# Patient Record
Sex: FEMALE | Race: WHITE
[De-identification: names, ages, dates, MRNs, and addresses within clinical notes are randomized per-mention and may not be internally consistent; named-entity substitution may affect disease eponyms.]

---

## 2018-12-30 ENCOUNTER — HOSPITAL ENCOUNTER (EMERGENCY)
Dept: HOSPITAL 80 - FED | Age: 23
Discharge: HOME | End: 2018-12-30
Payer: COMMERCIAL

## 2018-12-30 VITALS — DIASTOLIC BLOOD PRESSURE: 62 MMHG | SYSTOLIC BLOOD PRESSURE: 102 MMHG

## 2018-12-30 DIAGNOSIS — S30.0XXA: Primary | ICD-10-CM
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
## 2018-12-30 NOTE — EDPHY
H & P


Smoking Status: Never smoked


Time Seen by Provider: 12/30/18 21:14


HPI/ROS: 





CHIEF COMPLAINT:  Coccygeal pain





HISTORY OF PRESENT ILLNESS:  23-year-old female here after she fell wall 

hiking.  She states she was [place] a [place] and she slipped and started 

running down a Hill and then lost control and fell and tumbled onto her 

buttock.  There was no loss consciousness.  She denies any head injury, chest 

pain, abdominal pain, arm or leg pain.  She does admit to drinking multiple 

alcoholic beverages this evening.





ROS As detailed in HPI (Pa Xie)


Physical Exam: 





General:  Alert and oriented.  Nontoxic appearing.  No acute distress


HEENT:  Pupils PERRLA. No oral lesions.  


Head:  Atraumatic without Herring signs, raccoon eyes or hemotympanum


Neck:  No midline tenderness


Chest:  Lung sounds clear no chest wall tenderness


Cardiopulmonary:  Regular rate and rhythm.  No lower extremity edema


Skin:  Pink warm and dry.  No lesions.


Muscle skeletal:  Moving all 4 extremities.  Equal strength in upper 

extremities and lower extremities.  Ambulatory.  Tenderness to the lower lumbar 

spine and sacral area


 (Pa Xie)


Constitutional: 


 Initial Vital Signs











Temperature (C)  36.5 C   12/30/18 21:03


 


Heart Rate  140 H  12/30/18 21:03


 


Respiratory Rate  24 H  12/30/18 21:03


 


Blood Pressure  118/80   12/30/18 21:03


 


O2 Sat (%)  94   12/30/18 21:03








 











O2 Delivery Mode               Room Air














Allergies/Adverse Reactions: 


 





No Known Allergies Allergy (Unverified 12/30/18 21:02)


 








Home Medications: 














 Medication  Instructions  Recorded


 


Bcp  12/30/18


 


Hydrocodone/APAP 5/325 [[place] 1 tab PO Q6 #8 tab 12/30/18





5/325 (*)]  














Medical Decision Making





- Diagnostics


Imaging Results: 


 Imaging Impressions





Sacrum and Coccyx X-Ray  12/30/18 21:15


Impression: Normal.


 


2. AP Pelvis


 


History: Pain, fall hiking


 


Findings: The pelvic ring is normally aligned. The SI joints, hip joints and 

pubic symphysis look normal. No fracture is identified. Overall mineralization 

is normal.


 


Impression: Negative.


 


3. Sacrum and Coccyx, 3 views


 


History: Pain post fall hiking


 


Findings: Fecal material obscures portions of the sacrum on the 2 frontal 

views. On the lateral view the sacrum and coccyx are normally aligned. No 

fracture is identified.


 


Impression: Negative.








Lumbar Spine X-Ray  12/30/18 21:26


Impression: Normal.


 


2. AP Pelvis


 


History: Pain, fall hiking


 


Findings: The pelvic ring is normally aligned. The SI joints, hip joints and 

pubic symphysis look normal. No fracture is identified. Overall mineralization 

is normal.


 


Impression: Negative.


 


3. Sacrum and Coccyx, 3 views


 


History: Pain post fall hiking


 


Findings: Fecal material obscures portions of the sacrum on the 2 frontal 

views. On the lateral view the sacrum and coccyx are normally aligned. No 

fracture is identified.


 


Impression: Negative.








Pelvis X-Ray  12/30/18 21:26


Impression: Normal.


 


2. AP Pelvis


 


History: Pain, fall hiking


 


Findings: The pelvic ring is normally aligned. The SI joints, hip joints and 

pubic symphysis look normal. No fracture is identified. Overall mineralization 

is normal.


 


Impression: Negative.


 


3. Sacrum and Coccyx, 3 views


 


History: Pain post fall hiking


 


Findings: Fecal material obscures portions of the sacrum on the 2 frontal 

views. On the lateral view the sacrum and coccyx are normally aligned. No 

fracture is identified.


 


Impression: Negative.











ED Course/Re-evaluation: 





The patient was evaluated and managed by the physician's assistant.  My 

cosignature indicates that I reviewed the chart and I agree with the findings 

and plan of care as documented.  I am the secondary supervising physician. (

Zandra Menendez)


23-year-old female here after the trip and fall while hiking.  She is alert and 

oriented with no signs of altered mentation.  She has tenderness over the 

sacral region.  Pelvis is stable on exam an x-ray of the lumbar spine pelvis 

and sacrum are negative for fracture.  She was given pain control and agrees to 

follow up [place]. (Pa Xie)


Differential Diagnosis: 





Fracture, dislocation, compartment syndrome, cervical spine injury, pneumothorax

 (Pa Xie)





- Data Points


Medications Given: 


 








Discontinued Medications





Hydrocodone Bitart/Acetaminophen (Norco 5/325)  1 tab PO EDNOW ONE


   Stop: 12/30/18 21:51


   Last Admin: 12/30/18 21:52 Dose:  1 tab


Ibuprofen (Motrin)  600 mg PO EDNOW ONE


   Stop: 12/30/18 21:51


   Last Admin: 12/30/18 21:53 Dose:  600 mg


Ondansetron HCl (Zofran Odt)  4 mg PO ONCE ONE


   Stop: 12/30/18 21:51


   Last Admin: 12/30/18 21:52 Dose:  4 mg








Departure





- Departure


Disposition: Home, Routine, Self-Care


Clinical Impression: 


 Coccyx contusion, Lumbar contusion





Condition: Good


Instructions:  Coccyx Injury (ED)


Referrals: 


[facility] [Other] - As per Instructions


Prescriptions: 


Hydrocodone/APAP 5/325 [[place] 5/325 (*)] 1 tab PO Q6 #8 tab